# Patient Record
Sex: FEMALE | Race: WHITE | URBAN - METROPOLITAN AREA
[De-identification: names, ages, dates, MRNs, and addresses within clinical notes are randomized per-mention and may not be internally consistent; named-entity substitution may affect disease eponyms.]

---

## 2018-03-02 ENCOUNTER — OFFICE VISIT (OUTPATIENT)
Dept: OTOLARYNGOLOGY | Facility: CLINIC | Age: 63
End: 2018-03-02

## 2018-03-02 VITALS
HEIGHT: 61 IN | DIASTOLIC BLOOD PRESSURE: 76 MMHG | WEIGHT: 195 LBS | SYSTOLIC BLOOD PRESSURE: 121 MMHG | BODY MASS INDEX: 36.82 KG/M2

## 2018-03-02 DIAGNOSIS — R22.1 NECK MASS: Primary | ICD-10-CM

## 2018-03-02 PROCEDURE — 99214 OFFICE O/P EST MOD 30 MIN: CPT | Performed by: OTOLARYNGOLOGY

## 2018-03-02 PROCEDURE — 99212 OFFICE O/P EST SF 10 MIN: CPT | Performed by: OTOLARYNGOLOGY

## 2018-03-02 NOTE — PROGRESS NOTES
Braxton Odom is a 57year old female.   Patient presents with:  Lump: pt reports lump on right side of neck for 1 year, no pain, feel pressure      HISTORY OF PRESENT ILLNESS  She presents with a long history of chronic sinus issues and headaches with assoc • Cancer Father 79     prostate    • Heart Disease Other        Past Medical History:   Diagnosis Date   • Diabetes ([facility] [address]    • High cholesterol    • Hypertension    • Seasonal allergies        Past Surgical History:  No date: BREAST SURGERY PROCEDURE UNL Normal. Canal - Right: Normal, Left: Normal. TM - Right: Normal, Left: Normal.   Skin Normal Inspection - Normal.        Lymph Detail Normal Submental. Submandibular. Anterior cervical. Posterior cervical. Supraclavicular.         Nose/Mouth/Throat Normal E facial nerve. Return to see me after the study to discuss further management.  - CT SOFT TISSUE OF NECK(CONTRAST ONLY) (CPT=70491); Future            Domingo Damon MD    3/2/2018    10:17 AM

## 2018-03-15 ENCOUNTER — TELEPHONE (OUTPATIENT)
Dept: OTOLARYNGOLOGY | Facility: CLINIC | Age: 63
End: 2018-03-15

## 2018-03-15 NOTE — TELEPHONE ENCOUNTER
Spoke with pt and informed that she should bring her CT scan images to her appointment tomorrow at [place], pt verbalized understanding.

## 2018-03-16 ENCOUNTER — OFFICE VISIT (OUTPATIENT)
Dept: OTOLARYNGOLOGY | Facility: CLINIC | Age: 63
End: 2018-03-16

## 2018-03-16 VITALS
SYSTOLIC BLOOD PRESSURE: 110 MMHG | DIASTOLIC BLOOD PRESSURE: 71 MMHG | WEIGHT: 195 LBS | TEMPERATURE: 98 F | HEIGHT: 61 IN | BODY MASS INDEX: 36.82 KG/M2

## 2018-03-16 DIAGNOSIS — R22.1 NECK MASS: Primary | ICD-10-CM

## 2018-03-16 PROCEDURE — 99212 OFFICE O/P EST SF 10 MIN: CPT | Performed by: OTOLARYNGOLOGY

## 2018-03-16 PROCEDURE — 99214 OFFICE O/P EST MOD 30 MIN: CPT | Performed by: OTOLARYNGOLOGY

## 2018-03-16 NOTE — PROGRESS NOTES
aNdya Morfin is a 57year old female. Patient presents with:  Results: CT of the neck done on 3/13/18      HISTORY OF PRESENT ILLNESS  She presents with a long history of chronic sinus issues and headaches with associated throat pain.  1/2 PPD tobacco us Cigarettes    Smokeless tobacco: Never Used                        Alcohol use: No              Drug use:  No                Family History   Problem Relation Age of Onset   • Cancer Father 79     prostate    • Heart Disease Other        Past Medical Histor nose - Normal. Lips/teeth/gums - Normal. Tonsils - Normal. Oropharynx - Normal.   Ears Normal Inspection - Right: Normal, Left: Normal. Canal - Right: Normal, Left: Normal. TM - Right: Normal, Left: Normal.   Skin Normal Inspection - Normal.        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 She wishes to have it removed. We discussed the risks of surgery to include but not be limited to postoperative pain, bleeding as well as the anesthesia use.   I have recommended removal under local an

## 2018-03-19 ENCOUNTER — TELEPHONE (OUTPATIENT)
Dept: OTOLARYNGOLOGY | Facility: CLINIC | Age: 63
End: 2018-03-19

## 2018-03-19 NOTE — TELEPHONE ENCOUNTER
Pt contacted, scheduled surgery for 4/26/18 with Dr. Mauricio Quijano. Pre-post op instructions reviewed.

## 2018-03-19 NOTE — TELEPHONE ENCOUNTER
Pt's  called stating pt was seen on 3-16-18. Pt was told she would receive a call back today to schedule surgery at [address] [place] [address].   Please call

## 2018-04-27 ENCOUNTER — TELEPHONE (OUTPATIENT)
Dept: OTOLARYNGOLOGY | Facility: CLINIC | Age: 63
End: 2018-04-27

## 2018-04-27 NOTE — TELEPHONE ENCOUNTER
Pt is post op day 1, excision and closure of R neck lipoma. Per pt's , pt is doing well. Pt's  requested to schedule f/u appt w/ JDO next Friday.   Appt scheduled; requested pt's  have pt call our office so we may f/u w/ her and answe

## 2018-05-04 ENCOUNTER — OFFICE VISIT (OUTPATIENT)
Dept: OTOLARYNGOLOGY | Facility: CLINIC | Age: 63
End: 2018-05-04

## 2018-05-04 VITALS
SYSTOLIC BLOOD PRESSURE: 100 MMHG | HEIGHT: 61 IN | WEIGHT: 195 LBS | BODY MASS INDEX: 36.82 KG/M2 | DIASTOLIC BLOOD PRESSURE: 64 MMHG | TEMPERATURE: 98 F

## 2018-05-04 DIAGNOSIS — R22.1 NECK MASS: Primary | ICD-10-CM

## 2018-05-04 PROCEDURE — 99212 OFFICE O/P EST SF 10 MIN: CPT | Performed by: OTOLARYNGOLOGY

## 2018-05-04 PROCEDURE — 99024 POSTOP FOLLOW-UP VISIT: CPT | Performed by: OTOLARYNGOLOGY

## 2018-05-04 RX ORDER — HYDROCODONE BITARTRATE AND ACETAMINOPHEN 5; 325 MG/1; MG/1
TABLET ORAL
Refills: 0 | COMMUNITY
Start: 2018-04-26

## 2018-05-04 RX ORDER — CEPHALEXIN 500 MG/1
CAPSULE ORAL
Refills: 0 | COMMUNITY
Start: 2018-04-26

## 2018-05-04 NOTE — PROGRESS NOTES
Joanna Law is a 57year old female.   Patient presents with:  Post-Op: excision and closure of right neck lipoma done on 4-26, pt is doing well       HISTORY OF PRESENT ILLNESS  She presents with a long history of chronic sinus issues and headaches with a symptoms or complaints or concerns.       Social History    Marital status:              Spouse name:                       Years of education:                 Number of children:               Social History Main Topics    Smoking status: [employer]arett [employer]low situation. Appropriate mood and affect.    Neck Exam Normal Inspection - Normal. Palpation - Normal. Parotid gland - Normal. Thyroid gland - Normal.   Eyes Normal Conjunctiva - Right: Normal, Left: Normal. Pupil - Right: Normal, Left: Normal. Fundus - Right TRIAMCINOLONE ACETONIDE,NASAL, NA, , Disp: , Rfl:   •  aspirin 81 MG Oral Tab, Take 81 mg by mouth daily. , Disp: , Rfl:   •  Montelukast Sodium (SINGULAIR) 10 MG Oral Tab, Take 1 tablet (10 mg total) by mouth nightly., Disp: 30 tablet, Rfl: 3  •  loratadin
